# Patient Record
Sex: MALE | Race: BLACK OR AFRICAN AMERICAN | Employment: STUDENT | URBAN - METROPOLITAN AREA
[De-identification: names, ages, dates, MRNs, and addresses within clinical notes are randomized per-mention and may not be internally consistent; named-entity substitution may affect disease eponyms.]

---

## 2017-02-16 ENCOUNTER — HOSPITAL ENCOUNTER (EMERGENCY)
Facility: HOSPITAL | Age: 22
Discharge: HOME | End: 2017-02-16
Attending: PEDIATRICS

## 2017-02-16 VITALS
TEMPERATURE: 98 F | HEART RATE: 72 BPM | SYSTOLIC BLOOD PRESSURE: 128 MMHG | OXYGEN SATURATION: 100 % | RESPIRATION RATE: 18 BRPM | WEIGHT: 169.75 LBS | DIASTOLIC BLOOD PRESSURE: 78 MMHG

## 2017-02-16 DIAGNOSIS — S60.10XA SUBUNGUAL HEMATOMA OF FINGERNAIL, INITIAL ENCOUNTER: Primary | ICD-10-CM

## 2017-02-16 PROCEDURE — 11740 EVACUATION SUBUNGUAL HMTMA: CPT

## 2017-02-16 PROCEDURE — 99281 EMR DPT VST MAYX REQ PHY/QHP: CPT

## 2017-02-16 PROCEDURE — 99283 EMERGENCY DEPT VISIT LOW MDM: CPT

## 2017-02-16 NOTE — ED PROVIDER NOTES
Patient Seen in: [facility] Emergency Department    History   Patient presents with:  Nail, Ingrown (integumentary)  Cellulitis (integumentary, infectious)    Stated Complaint: toe infection    HPI    Patient is a 24-year-old male here with bilatera Supple, full range of motion. CV: Chest is clear to auscultation, no wheezes rales or rhonchi. Cardiac exam normal S1-S2, no murmurs rubs or gallops. Abdomen: Soft, nontender, nondistended. Bowel sounds present throughout.   Extremities: Warm and well p

## 2017-06-14 ENCOUNTER — HOSPITAL ENCOUNTER (EMERGENCY)
Facility: HOSPITAL | Age: 22
Discharge: HOME | End: 2017-06-14
Attending: EMERGENCY MEDICINE
Payer: MEDICAID

## 2017-06-14 VITALS
RESPIRATION RATE: 18 BRPM | OXYGEN SATURATION: 100 % | HEART RATE: 68 BPM | TEMPERATURE: 98 F | DIASTOLIC BLOOD PRESSURE: 88 MMHG | WEIGHT: 171.31 LBS | SYSTOLIC BLOOD PRESSURE: 130 MMHG

## 2017-06-14 DIAGNOSIS — B27.90 INFECTIOUS MONONUCLEOSIS WITHOUT COMPLICATION, INFECTIOUS MONONUCLEOSIS DUE TO UNSPECIFIED ORGANISM: Primary | ICD-10-CM

## 2017-06-14 PROCEDURE — 86403 PARTICLE AGGLUT ANTBDY SCRN: CPT | Performed by: EMERGENCY MEDICINE

## 2017-06-14 PROCEDURE — 80053 COMPREHEN METABOLIC PANEL: CPT | Performed by: EMERGENCY MEDICINE

## 2017-06-14 PROCEDURE — 83615 LACTATE (LD) (LDH) ENZYME: CPT | Performed by: EMERGENCY MEDICINE

## 2017-06-14 PROCEDURE — 99284 EMERGENCY DEPT VISIT MOD MDM: CPT

## 2017-06-14 PROCEDURE — 86664 EPSTEIN-BARR NUCLEAR ANTIGEN: CPT | Performed by: EMERGENCY MEDICINE

## 2017-06-14 PROCEDURE — 96374 THER/PROPH/DIAG INJ IV PUSH: CPT

## 2017-06-14 PROCEDURE — 86140 C-REACTIVE PROTEIN: CPT | Performed by: EMERGENCY MEDICINE

## 2017-06-14 PROCEDURE — 86665 EPSTEIN-BARR CAPSID VCA: CPT | Performed by: EMERGENCY MEDICINE

## 2017-06-14 PROCEDURE — 84550 ASSAY OF BLOOD/URIC ACID: CPT | Performed by: EMERGENCY MEDICINE

## 2017-06-14 PROCEDURE — 87430 STREP A AG IA: CPT | Performed by: EMERGENCY MEDICINE

## 2017-06-14 PROCEDURE — 85025 COMPLETE CBC W/AUTO DIFF WBC: CPT | Performed by: EMERGENCY MEDICINE

## 2017-06-14 PROCEDURE — 85652 RBC SED RATE AUTOMATED: CPT | Performed by: EMERGENCY MEDICINE

## 2017-06-14 PROCEDURE — 87081 CULTURE SCREEN ONLY: CPT | Performed by: EMERGENCY MEDICINE

## 2017-06-14 RX ORDER — IBUPROFEN 200 MG
200 TABLET ORAL EVERY 6 HOURS PRN
COMMUNITY

## 2017-06-14 RX ORDER — METHYLPREDNISOLONE SODIUM SUCCINATE 125 MG/2ML
125 INJECTION, POWDER, LYOPHILIZED, FOR SOLUTION INTRAMUSCULAR; INTRAVENOUS ONCE
Status: COMPLETED | OUTPATIENT
Start: 2017-06-14 | End: 2017-06-14

## 2017-06-14 RX ORDER — PREDNISONE 20 MG/1
60 TABLET ORAL DAILY
Qty: 9 TABLET | Refills: 0 | Status: SHIPPED | OUTPATIENT
Start: 2017-06-14 | End: 2017-06-17

## 2017-06-14 NOTE — ED PROVIDER NOTES
Patient Seen in: [facility] Emergency Department    History   Patient presents with:  Neck Pain (musculoskeletal, neurologic)  Swelling Edema (cardiovascular, metabolic)    Stated Complaint: neck pain    HPI    Henna Frank is a 65-year-old who presents (36.7 °C)   Temp src 06/14/17 1412 Temporal   SpO2 06/14/17 1411 100 %   O2 Device 06/14/17 1411 None (Room air)       Current:/88 mmHg  Pulse 68  Temp(Src) 98 °F (36.7 °C) (Temporal)  Resp 18  Wt 77.7 kg  SpO2 100%        Physical Exam  General: Evangelina Sing RAPID STREP A SCREEN (LC) - Normal   CBC WITH DIFFERENTIAL WITH PLATELET    Narrative: The following orders were created for panel order CBC WITH DIFFERENTIAL WITH PLATELET.   Procedure                               Abnormality         Status

## 2017-08-11 ENCOUNTER — APPOINTMENT (OUTPATIENT)
Dept: CT IMAGING | Facility: HOSPITAL | Age: 22
DRG: 153 | End: 2017-08-11
Attending: EMERGENCY MEDICINE
Payer: MEDICAID

## 2017-08-11 ENCOUNTER — HOSPITAL ENCOUNTER (INPATIENT)
Facility: HOSPITAL | Age: 22
LOS: 3 days | Discharge: HOME | DRG: 153 | End: 2017-08-14
Attending: EMERGENCY MEDICINE | Admitting: HOSPITALIST
Payer: MEDICAID

## 2017-08-11 DIAGNOSIS — J36 PERITONSILLAR CELLULITIS: Primary | ICD-10-CM

## 2017-08-11 DIAGNOSIS — R55 SYNCOPE AND COLLAPSE: ICD-10-CM

## 2017-08-11 PROBLEM — D72.829 LEUKOCYTOSIS: Status: ACTIVE | Noted: 2017-08-11

## 2017-08-11 PROBLEM — E87.6 HYPOKALEMIA: Status: ACTIVE | Noted: 2017-08-11

## 2017-08-11 LAB
ALBUMIN SERPL-MCNC: 3.7 G/DL (ref 3.5–4.8)
ALP LIVER SERPL-CCNC: 93 U/L (ref 45–117)
ALT SERPL-CCNC: 17 U/L (ref 17–63)
AST SERPL-CCNC: 18 U/L (ref 15–41)
BASOPHILS # BLD AUTO: 0.03 X10(3) UL (ref 0–0.1)
BASOPHILS NFR BLD AUTO: 0.2 %
BILIRUB SERPL-MCNC: 2.1 MG/DL (ref 0.1–2)
BUN BLD-MCNC: 6 MG/DL (ref 8–20)
CALCIUM BLD-MCNC: 9.8 MG/DL (ref 8.3–10.3)
CHLORIDE: 102 MMOL/L (ref 101–111)
CO2: 26 MMOL/L (ref 22–32)
CREAT BLD-MCNC: 0.95 MG/DL (ref 0.7–1.3)
EOSINOPHIL # BLD AUTO: 0.15 X10(3) UL (ref 0–0.3)
EOSINOPHIL NFR BLD AUTO: 1 %
ERYTHROCYTE [DISTWIDTH] IN BLOOD BY AUTOMATED COUNT: 11.9 % (ref 11.5–16)
FREE T4: 1.1 NG/DL (ref 0.9–1.8)
GLUCOSE BLD-MCNC: 106 MG/DL (ref 70–99)
HCT VFR BLD AUTO: 47 % (ref 37–53)
HGB BLD-MCNC: 15.1 G/DL (ref 13–17)
IMMATURE GRANULOCYTE COUNT: 0.04 X10(3) UL (ref 0–1)
IMMATURE GRANULOCYTE RATIO %: 0.3 %
LYMPHOCYTES # BLD AUTO: 1.36 X10(3) UL (ref 0.9–4)
LYMPHOCYTES NFR BLD AUTO: 9.2 %
M PROTEIN MFR SERPL ELPH: 8.2 G/DL (ref 6.1–8.3)
MCH RBC QN AUTO: 28 PG (ref 27–33.2)
MCHC RBC AUTO-ENTMCNC: 32.1 G/DL (ref 31–37)
MCV RBC AUTO: 87.2 FL (ref 80–99)
MONOCYTES # BLD AUTO: 1.14 X10(3) UL (ref 0.1–0.6)
MONOCYTES NFR BLD AUTO: 7.7 %
MONOSCREEN: POSITIVE
NEUTROPHIL ABS PRELIM: 12.03 X10 (3) UL (ref 1.3–6.7)
NEUTROPHILS # BLD AUTO: 12.03 X10(3) UL (ref 1.3–6.7)
NEUTROPHILS NFR BLD AUTO: 81.6 %
PLATELET # BLD AUTO: 325 10(3)UL (ref 150–450)
POTASSIUM SERPL-SCNC: 3.2 MMOL/L (ref 3.6–5.1)
RBC # BLD AUTO: 5.39 X10(6)UL (ref 4.3–5.7)
RED CELL DISTRIBUTION WIDTH-SD: 38 FL (ref 35.1–46.3)
SODIUM SERPL-SCNC: 137 MMOL/L (ref 136–144)
TROPONIN: <0.046 NG/ML (ref ?–0.05)
TSI SER-ACNC: 0.82 MIU/ML (ref 0.35–5.5)
WBC # BLD AUTO: 14.8 X10(3) UL (ref 4–13)

## 2017-08-11 PROCEDURE — 99223 1ST HOSP IP/OBS HIGH 75: CPT | Performed by: HOSPITALIST

## 2017-08-11 PROCEDURE — 70491 CT SOFT TISSUE NECK W/DYE: CPT | Performed by: EMERGENCY MEDICINE

## 2017-08-11 RX ORDER — SODIUM CHLORIDE 9 MG/ML
INJECTION, SOLUTION INTRAVENOUS CONTINUOUS
Status: CANCELLED | OUTPATIENT
Start: 2017-08-11 | End: 2017-08-12

## 2017-08-11 RX ORDER — HYDROMORPHONE HYDROCHLORIDE 1 MG/ML
0.5 INJECTION, SOLUTION INTRAMUSCULAR; INTRAVENOUS; SUBCUTANEOUS ONCE
Status: COMPLETED | OUTPATIENT
Start: 2017-08-11 | End: 2017-08-11

## 2017-08-11 RX ORDER — CLINDAMYCIN PHOSPHATE 600 MG/50ML
600 INJECTION INTRAVENOUS ONCE
Status: COMPLETED | OUTPATIENT
Start: 2017-08-11 | End: 2017-08-11

## 2017-08-11 RX ORDER — KETOROLAC TROMETHAMINE 30 MG/ML
30 INJECTION, SOLUTION INTRAMUSCULAR; INTRAVENOUS ONCE
Status: COMPLETED | OUTPATIENT
Start: 2017-08-11 | End: 2017-08-11

## 2017-08-11 NOTE — ED INITIAL ASSESSMENT (HPI)
Complaining of Throat swelling, unable to swallow, spitting, right ear pain, difficulty breathing. Hard time talking. Passed out twice today.

## 2017-08-12 ENCOUNTER — APPOINTMENT (OUTPATIENT)
Dept: CV DIAGNOSTICS | Facility: HOSPITAL | Age: 22
DRG: 153 | End: 2017-08-12
Attending: HOSPITALIST
Payer: MEDICAID

## 2017-08-12 LAB
AMPHETAMINE URINE: NEGATIVE
BARBITURATES URINE: NEGATIVE
BILIRUB UR QL STRIP.AUTO: NEGATIVE
CLARITY UR REFRACT.AUTO: CLEAR
COCAINE URINE: NEGATIVE
GLUCOSE UR STRIP.AUTO-MCNC: NEGATIVE MG/DL
KETONES UR STRIP.AUTO-MCNC: 80 MG/DL
LEUKOCYTE ESTERASE UR QL STRIP.AUTO: NEGATIVE
NITRITE UR QL STRIP.AUTO: NEGATIVE
PCP URINE: NEGATIVE
PH UR STRIP.AUTO: 6 [PH] (ref 4.5–8)
POTASSIUM SERPL-SCNC: 3.7 MMOL/L (ref 3.6–5.1)
PROT UR STRIP.AUTO-MCNC: 100 MG/DL
RBC UR QL AUTO: NEGATIVE
SP GR UR STRIP.AUTO: >1.06 (ref 1–1.03)
TROPONIN: <0.046 NG/ML (ref ?–0.05)
UROBILINOGEN UR STRIP.AUTO-MCNC: 4 MG/DL

## 2017-08-12 PROCEDURE — 93306 TTE W/DOPPLER COMPLETE: CPT | Performed by: HOSPITALIST

## 2017-08-12 PROCEDURE — 99233 SBSQ HOSP IP/OBS HIGH 50: CPT | Performed by: HOSPITALIST

## 2017-08-12 RX ORDER — KETOROLAC TROMETHAMINE 30 MG/ML
30 INJECTION, SOLUTION INTRAMUSCULAR; INTRAVENOUS EVERY 6 HOURS PRN
Status: DISPENSED | OUTPATIENT
Start: 2017-08-12 | End: 2017-08-14

## 2017-08-12 RX ORDER — IBUPROFEN 200 MG
200 TABLET ORAL EVERY 4 HOURS PRN
Status: DISCONTINUED | OUTPATIENT
Start: 2017-08-12 | End: 2017-08-14

## 2017-08-12 RX ORDER — DEXAMETHASONE SODIUM PHOSPHATE 4 MG/ML
4 VIAL (ML) INJECTION EVERY 6 HOURS
Status: DISCONTINUED | OUTPATIENT
Start: 2017-08-12 | End: 2017-08-14

## 2017-08-12 RX ORDER — ACETAMINOPHEN 325 MG/1
650 TABLET ORAL EVERY 6 HOURS PRN
Status: DISCONTINUED | OUTPATIENT
Start: 2017-08-12 | End: 2017-08-14

## 2017-08-12 RX ORDER — PREDNISONE 20 MG/1
40 TABLET ORAL
Status: DISCONTINUED | OUTPATIENT
Start: 2017-08-12 | End: 2017-08-12

## 2017-08-12 RX ORDER — NICOTINE 21 MG/24HR
1 PATCH, TRANSDERMAL 24 HOURS TRANSDERMAL DAILY
Status: DISCONTINUED | OUTPATIENT
Start: 2017-08-12 | End: 2017-08-14

## 2017-08-12 RX ORDER — IBUPROFEN 400 MG/1
400 TABLET ORAL EVERY 4 HOURS PRN
Status: DISCONTINUED | OUTPATIENT
Start: 2017-08-12 | End: 2017-08-14

## 2017-08-12 RX ORDER — ALBUTEROL SULFATE 90 UG/1
2 AEROSOL, METERED RESPIRATORY (INHALATION) EVERY 6 HOURS PRN
Status: DISCONTINUED | OUTPATIENT
Start: 2017-08-12 | End: 2017-08-14

## 2017-08-12 RX ORDER — SODIUM CHLORIDE 9 MG/ML
INJECTION, SOLUTION INTRAVENOUS CONTINUOUS
Status: DISCONTINUED | OUTPATIENT
Start: 2017-08-12 | End: 2017-08-14

## 2017-08-12 RX ORDER — POTASSIUM CHLORIDE 14.9 MG/ML
20 INJECTION INTRAVENOUS ONCE
Status: COMPLETED | OUTPATIENT
Start: 2017-08-12 | End: 2017-08-12

## 2017-08-12 RX ORDER — IBUPROFEN 600 MG/1
600 TABLET ORAL EVERY 4 HOURS PRN
Status: DISCONTINUED | OUTPATIENT
Start: 2017-08-12 | End: 2017-08-14

## 2017-08-12 RX ORDER — ONDANSETRON 2 MG/ML
4 INJECTION INTRAMUSCULAR; INTRAVENOUS EVERY 4 HOURS PRN
Status: DISCONTINUED | OUTPATIENT
Start: 2017-08-12 | End: 2017-08-14

## 2017-08-12 RX ORDER — CLINDAMYCIN PHOSPHATE 600 MG/50ML
600 INJECTION INTRAVENOUS EVERY 8 HOURS
Status: DISCONTINUED | OUTPATIENT
Start: 2017-08-12 | End: 2017-08-12

## 2017-08-12 NOTE — PROGRESS NOTES
[facility]acy Note:  Renal Adjustment for Unasyn (ampicillin/sulbactam)    Ashley Vigil. is a 25year old male who has been prescribed Unasyn (ampicillin/sulbactam) 1.5 g every 6 hrs.   CrCl is estimated creatinine clearance is 140.5 mL/min

## 2017-08-12 NOTE — ED PROVIDER NOTES
Patient Seen in: [facility] 4nw-a    History   Patient presents with:  Syncope (cardiovascular, neurologic)  Swelling Edema (cardiovascular, metabolic)    Stated Complaint: facial swelling syncope    HPI    This is a 14-year-old male complaining of 0.00      Smokeless tobacco: Never Used                      Alcohol use: Yes              Comment: last drink- last night      Review of Systems    Positive for stated complaint: facial swelling syncope  Other systems are as noted in HPI.   Constitutional normally. No focal deficits visualized.     ED Course     Labs Reviewed   COMP METABOLIC PANEL (14) - Abnormal; Notable for the following:        Result Value    Glucose 106 (*)     BUN 6 (*)     Bilirubin, Total 2.1 (*)     Potassium 3.2 (*)     All other ([facility]) NRDR ([address]) which includes the Dose Index Registry. PATIENT STATED HISTORY:(As transcribed by Technologist)  Patient has swelling in his throat, spitting, unable to swallow and right ear pain.  Pa Course  ------------------------------------------------------------   Initially patient's orthostatic vital signs were abnormal as his pulse jumped up with standing and he felt dizzy.   He had an IV placed and was given 2 L of normal saline with some impro

## 2017-08-12 NOTE — PROGRESS NOTES
NURSING ADMISSION NOTE      Patient admitted via Cart from ER accompanied by his mother. Oriented to room. Safety precautions initiated, bed in low position  Call lights within reach.   Afebrile on admission,c/o  severe right sided  Throat pain, no s/s

## 2017-08-12 NOTE — PROGRESS NOTES
[facility]  Progress Note     Jose Gabriel [facility] Gregory Good.  Patient Status:  Observation    10/29/1995 MRN [id number]   [facility] 4NW-A Attending Bony Huston MD   Hosp Day # 0 PCP CEDRIC KUMARI, Aisha Cox MD     Chief Complaint: Moises Aguirre to IV Unasyn  3. Start IV steroids  4. Monitor airway, sats  2. Abnormal EKG, 2D echo pending  3. Hypokalemia, replace  4. H/o Kawasaki    Plan of care: switch IV abx, start IV steroids, await 2D echo    Quality:  · DVT Prophylaxis: SCDs  · CODE status:  Fu

## 2017-08-12 NOTE — H&P
[place] HOSPITALIST  History and Physical     [address] [place] [address].  Patient Status:  Emergency    10/29/1995 MRN [id number]   Location [address] [facility] [address] [facility] Attending Sandro Lerner MD   Hosp Day # 0 PCP [facility] MD, Soheila Ramos MD Wheezing. Disp:  Rfl:    fluticasone (FLOVENT HFA) 110 MCG/ACT Inhalation Aerosol Inhale 1 puff into the lungs 2 (two) times daily.  Disp:  Rfl:    ipratropium-albuterol (COMBIVENT)  MCG/ACT Inhalation Aerosol Inhale 2 puffs into the lungs every 6 (si the age of 5    Quality:  · DVT Prophylaxis: low risk  · CODE status: full  · Rodriguez: no  Plan of care discussed with patient     Eladio Mattson MD  8/11/2017

## 2017-08-12 NOTE — PROGRESS NOTES
Throat/ neck pain improved since this am. Able to tolerate solids. Temp max 99.9  This shift in am. Profuse sweating on and off. Orthostatics checked x1. On Telemetry- SR-ST. On RA/CPOX sats 100%.

## 2017-08-12 NOTE — CONSULTS
[facility]  Report of Consultation    Zoey Clemons.  Patient Status:  Observation    10/29/1995 MRN [id number]   [facility] 4NW-A Attending Mesha Campbell MD   Hosp Day # 0 PCP CEDRIC KUMARI, Kimberly Szymanski MD     Reason for Consult Oral Q4H PRN   Or      ibuprofen (MOTRIN) tab 600 mg 600 mg Oral Q4H PRN   0.9%  NaCl infusion  Intravenous Continuous   clindamycin in D5W (CLEOCIN) premix 600mg/50ml 600 mg Intravenous Q8H   Umeclidinium Bromide (INCRUSE ELLIPTA) 62.5 MCG/INH inhaler 1 p fracture of metatarsal bone(s)     Hypokalemia     Leukocytosis     Peritonsillar cellulitis     Abnormal EKG     Kawasaki disease ([facility] [address]      1. Infectious mononucleosis  2.  Right parapharyngeal cellulitis with enlargement of the right lingual tonsil and r

## 2017-08-13 ENCOUNTER — APPOINTMENT (OUTPATIENT)
Dept: GENERAL RADIOLOGY | Facility: HOSPITAL | Age: 22
DRG: 153 | End: 2017-08-13
Attending: INTERNAL MEDICINE
Payer: MEDICAID

## 2017-08-13 LAB
ATRIAL RATE: 86 BPM
ATRIAL RATE: 95 BPM
BASOPHILS # BLD AUTO: 0.03 X10(3) UL (ref 0–0.1)
BASOPHILS NFR BLD AUTO: 0.1 %
BETA NATRIURETIC PEPTIDE: 23 PG/ML (ref 2–99)
BUN BLD-MCNC: 9 MG/DL (ref 8–20)
CALCIUM BLD-MCNC: 9.3 MG/DL (ref 8.3–10.3)
CHLORIDE: 107 MMOL/L (ref 101–111)
CHOLEST SMN-MCNC: 161 MG/DL (ref ?–170)
CK: 81 IU/L (ref 39–308)
CO2: 26 MMOL/L (ref 22–32)
CREAT BLD-MCNC: 0.78 MG/DL (ref 0.7–1.3)
EOSINOPHIL # BLD AUTO: 0 X10(3) UL (ref 0–0.3)
EOSINOPHIL NFR BLD AUTO: 0 %
ERYTHROCYTE [DISTWIDTH] IN BLOOD BY AUTOMATED COUNT: 11.7 % (ref 11.5–16)
GLUCOSE BLD-MCNC: 140 MG/DL (ref 70–99)
HAV IGM SER QL: 2.2 MG/DL (ref 1.7–3)
HCT VFR BLD AUTO: 43.1 % (ref 37–53)
HDLC SERPL-MCNC: 77 MG/DL (ref 45–?)
HDLC SERPL: 2.09 {RATIO} (ref ?–4.97)
HGB BLD-MCNC: 13.8 G/DL (ref 13–17)
IMMATURE GRANULOCYTE COUNT: 0.47 X10(3) UL (ref 0–1)
IMMATURE GRANULOCYTE RATIO %: 1.8 %
LDLC SERPL CALC-MCNC: 71 MG/DL (ref ?–100)
LDLC SERPL-MCNC: 13 MG/DL (ref 5–40)
LYMPHOCYTES # BLD AUTO: 0.6 X10(3) UL (ref 0.9–4)
LYMPHOCYTES NFR BLD AUTO: 2.3 %
MCH RBC QN AUTO: 28.2 PG (ref 27–33.2)
MCHC RBC AUTO-ENTMCNC: 32 G/DL (ref 31–37)
MCV RBC AUTO: 88 FL (ref 80–99)
MONOCYTES # BLD AUTO: 1.46 X10(3) UL (ref 0.1–0.6)
MONOCYTES NFR BLD AUTO: 5.6 %
NEUTROPHIL ABS PRELIM: 23.43 X10 (3) UL (ref 1.3–6.7)
NEUTROPHILS # BLD AUTO: 23.43 X10(3) UL (ref 1.3–6.7)
NEUTROPHILS NFR BLD AUTO: 90.2 %
NONHDLC SERPL-MCNC: 84 MG/DL (ref ?–120)
P AXIS: 106 DEGREES
P AXIS: 37 DEGREES
P-R INTERVAL: 144 MS
P-R INTERVAL: 150 MS
PLATELET # BLD AUTO: 334 10(3)UL (ref 150–450)
POTASSIUM SERPL-SCNC: 4.3 MMOL/L (ref 3.6–5.1)
POTASSIUM SERPL-SCNC: 4.3 MMOL/L (ref 3.6–5.1)
Q-T INTERVAL: 324 MS
Q-T INTERVAL: 336 MS
QRS DURATION: 94 MS
QRS DURATION: 98 MS
QTC CALCULATION (BEZET): 402 MS
QTC CALCULATION (BEZET): 407 MS
R AXIS: 87 DEGREES
R AXIS: 98 DEGREES
RBC # BLD AUTO: 4.9 X10(6)UL (ref 4.3–5.7)
RED CELL DISTRIBUTION WIDTH-SD: 37.8 FL (ref 35.1–46.3)
SED RATE-ML: 31 MM/HR (ref 0–12)
SODIUM SERPL-SCNC: 141 MMOL/L (ref 136–144)
T AXIS: 133 DEGREES
T AXIS: 33 DEGREES
TRIGLYCERIDES: 63 MG/DL (ref ?–90)
TROPONIN: <0.046 NG/ML (ref ?–0.05)
VENTRICULAR RATE: 86 BPM
VENTRICULAR RATE: 95 BPM
WBC # BLD AUTO: 26 X10(3) UL (ref 4–13)

## 2017-08-13 PROCEDURE — 99232 SBSQ HOSP IP/OBS MODERATE 35: CPT | Performed by: HOSPITALIST

## 2017-08-13 PROCEDURE — 71010 XR CHEST AP PORTABLE  (CPT=71010): CPT | Performed by: INTERNAL MEDICINE

## 2017-08-13 RX ORDER — MORPHINE SULFATE 4 MG/ML
2 INJECTION, SOLUTION INTRAMUSCULAR; INTRAVENOUS EVERY 4 HOURS PRN
Status: DISCONTINUED | OUTPATIENT
Start: 2017-08-13 | End: 2017-08-14

## 2017-08-13 NOTE — PROGRESS NOTES
Afebrile this shift. Sweats profusely at times. Throat discomfort improved. Medicated w/motrin prn. Tolerating regular diet. No c/o chest pain. SR on monitor.

## 2017-08-13 NOTE — PLAN OF CARE
CARDIOVASCULAR - ADULT    • Maintains optimal cardiac output and hemodynamic stability Progressing    • Absence of cardiac arrhythmias or at baseline Progressing        Impaired Swallowing    • Minimize aspiration risk Progressing        Patient/Family [facility]

## 2017-08-13 NOTE — PROGRESS NOTES
[facility]  Progress Note     Amadou So [facility] Rosa Ashley.  Patient Status:  Observation    10/29/1995 MRN [id number]   [facility] 4NW-A Attending Bo Lan MD   Hosp Day # 0 PCP CEDRIC KUMARI, Arden Marquez MD     Chief Complaint: Corby [place]hatan <0.046  <0.046   CK   --    --   81            Imaging: Imaging data reviewed in Epic.     Medications:   • Umeclidinium Bromide  1 puff Inhalation Daily   • dexamethasone Sodium Phosphate  4 mg Intravenous Q6H   • ampicillin-sulbactam  3 g Intravenous Q8H

## 2017-08-13 NOTE — PROGRESS NOTES
Results of this am EKG called to Dr Eros Cordero. Troponin negative. CP 2 on pain scale with deep breath.  Medicated w/motrin

## 2017-08-13 NOTE — CONSULTS
INFECTIOUS DISEASE CONSULT NOTE    Shiva Davis.  Patient Status:  Observation    10/29/1995 MRN [id number]   [facility] 4NW-A Attending Last Ron MD   [facility] Day # •  morphINE sulfate (PF) 4 MG/ML injection 2 mg, 2 mg, Intravenous, Q4H PRN **OR** morphINE sulfate (PF) 4 MG/ML injection 2 mg, 2 mg, Intravenous, Q4H PRN  •  ondansetron HCl (ZOFRAN) injection 4 mg, 4 mg, Intravenous, Q4H PRN  •  acetaminophen (TYLENOL lesions. No erythema. No open wounds. Laboratory Data:    Recent Labs   Lab  08/11/17   1855  08/13/17   0614   RBC  5.39  4.90   HGB  15.1  13.8   HCT  47.0  43.1   MCV  87.2  88.0   MCH  28.0  28.2   MCHC  32.1  32.0   RDW  11.9  11.7   NEPRELIM  12. 0 NASOPHARYNX:  Negative. Fossae of Rosenmuller and torus tubarius are symmetric. ORAL CAVITY:  Negative. No visible mass.   OROPHARYNX:  There is enlargement/swelling of the right lingual tonsil with associated diffuse right-sided parapharyngeal low densi 8/13/2017 at 7:15     Approved by: Franny Juan DO               ASSESSMENT:    1. R parapharyngeal cellulitis, no abscess noted per CT  -clinically better, less swelling    2.  Reactive cervical LAD- due to above  -mom tells me that he has chronic R neck

## 2017-08-13 NOTE — PROGRESS NOTES
[facility] Card[facility] Progress Note    Subjective:  Feeling better.      Objective:  /87 (BP Location: Left arm)   Pulse 75   Temp (!) 97.4 °F (36.3 °C) (Oral)   Resp 18   Ht 198.1 cm (6' 6\")   Wt 178 lb 3.2 oz (80.8 kg)   SpO2 97%   BMI 20.59 kg/m²

## 2017-08-14 VITALS
WEIGHT: 178.19 LBS | HEIGHT: 78 IN | DIASTOLIC BLOOD PRESSURE: 71 MMHG | HEART RATE: 77 BPM | RESPIRATION RATE: 18 BRPM | OXYGEN SATURATION: 98 % | TEMPERATURE: 98 F | BODY MASS INDEX: 20.62 KG/M2 | SYSTOLIC BLOOD PRESSURE: 140 MMHG

## 2017-08-14 LAB
ALBUMIN SERPL-MCNC: 2.6 G/DL (ref 3.5–4.8)
ALP LIVER SERPL-CCNC: 74 U/L (ref 45–117)
ALT SERPL-CCNC: 32 U/L (ref 17–63)
AST SERPL-CCNC: 26 U/L (ref 15–41)
ATRIAL RATE: 113 BPM
ATRIAL RATE: 72 BPM
BASOPHILS # BLD AUTO: 0.02 X10(3) UL (ref 0–0.1)
BASOPHILS NFR BLD AUTO: 0.1 %
BILIRUB SERPL-MCNC: 0.7 MG/DL (ref 0.1–2)
BUN BLD-MCNC: 10 MG/DL (ref 8–20)
CALCIUM BLD-MCNC: 9.3 MG/DL (ref 8.3–10.3)
CHLORIDE: 106 MMOL/L (ref 101–111)
CO2: 27 MMOL/L (ref 22–32)
CREAT BLD-MCNC: 0.71 MG/DL (ref 0.7–1.3)
EOSINOPHIL # BLD AUTO: 0 X10(3) UL (ref 0–0.3)
EOSINOPHIL NFR BLD AUTO: 0 %
ERYTHROCYTE [DISTWIDTH] IN BLOOD BY AUTOMATED COUNT: 11.9 % (ref 11.5–16)
GLUCOSE BLD-MCNC: 161 MG/DL (ref 70–99)
HCT VFR BLD AUTO: 40.1 % (ref 37–53)
HGB BLD-MCNC: 12.9 G/DL (ref 13–17)
IMMATURE GRANULOCYTE COUNT: 0.13 X10(3) UL (ref 0–1)
IMMATURE GRANULOCYTE RATIO %: 0.8 %
LYMPHOCYTES # BLD AUTO: 0.98 X10(3) UL (ref 0.9–4)
LYMPHOCYTES NFR BLD AUTO: 6.2 %
M PROTEIN MFR SERPL ELPH: 6.9 G/DL (ref 6.1–8.3)
MCH RBC QN AUTO: 28.5 PG (ref 27–33.2)
MCHC RBC AUTO-ENTMCNC: 32.2 G/DL (ref 31–37)
MCV RBC AUTO: 88.7 FL (ref 80–99)
MONOCYTES # BLD AUTO: 0.78 X10(3) UL (ref 0.1–0.6)
MONOCYTES NFR BLD AUTO: 4.9 %
NEUTROPHIL ABS PRELIM: 13.86 X10 (3) UL (ref 1.3–6.7)
NEUTROPHILS # BLD AUTO: 13.86 X10(3) UL (ref 1.3–6.7)
NEUTROPHILS NFR BLD AUTO: 88 %
P AXIS: 52 DEGREES
P AXIS: 52 DEGREES
P-R INTERVAL: 134 MS
P-R INTERVAL: 152 MS
PLATELET # BLD AUTO: 340 10(3)UL (ref 150–450)
POTASSIUM SERPL-SCNC: 4.2 MMOL/L (ref 3.6–5.1)
Q-T INTERVAL: 302 MS
Q-T INTERVAL: 354 MS
QRS DURATION: 76 MS
QRS DURATION: 92 MS
QTC CALCULATION (BEZET): 387 MS
QTC CALCULATION (BEZET): 414 MS
R AXIS: 103 DEGREES
R AXIS: 99 DEGREES
RBC # BLD AUTO: 4.52 X10(6)UL (ref 4.3–5.7)
RED CELL DISTRIBUTION WIDTH-SD: 38.4 FL (ref 35.1–46.3)
SODIUM SERPL-SCNC: 140 MMOL/L (ref 136–144)
T AXIS: 45 DEGREES
T AXIS: 57 DEGREES
VENTRICULAR RATE: 113 BPM
VENTRICULAR RATE: 72 BPM
WBC # BLD AUTO: 15.8 X10(3) UL (ref 4–13)

## 2017-08-14 PROCEDURE — 99239 HOSP IP/OBS DSCHRG MGMT >30: CPT | Performed by: HOSPITALIST

## 2017-08-14 RX ORDER — PREDNISONE 20 MG/1
40 TABLET ORAL DAILY
Qty: 10 TABLET | Refills: 0 | Status: SHIPPED | OUTPATIENT
Start: 2017-08-15 | End: 2017-08-20

## 2017-08-14 RX ORDER — AMOXICILLIN AND CLAVULANATE POTASSIUM 875; 125 MG/1; MG/1
1 TABLET, FILM COATED ORAL 2 TIMES DAILY
Qty: 14 TABLET | Refills: 0 | Status: SHIPPED | OUTPATIENT
Start: 2017-08-14 | End: 2017-08-21

## 2017-08-14 NOTE — PLAN OF CARE
Absence of cardiac arrhythmias or at baseline Progressing    Has been NSR on tele. Remains afebrile. States throat pain is 3/10. Jaja Garsia given at [facility]. Many friends in [place] this david. Girlfriend at [facility] throughout the night. IV antibiotics as ordered and IV fluids.

## 2017-08-14 NOTE — PROGRESS NOTES
[address] [place] [address]  TEL: [phone]  FAX: [phone]    Machelle Cortés.  Patient Status:  Observation    10/29/1995 MRN [id number]   [facility] 4NW-A Attending Edilia Holley MD   [address] [facility] Day # 0 PC in EMR,     Radiology: Ct Soft Tissue Of Neck(contrast Only) (cpt=70491)    Result Date: 8/11/2017  PROCEDURE:  CT OF THE SOFT TISSUE NECK WITH CONTRAST  COMPARISON:  None.   INDICATIONS:  25year-old male presents with swelling in the region of the throat, lesions. OTHER:  No additional findings. CONCLUSION:  There is evidence of a right-sided parapharyngeal cellulitis with enlargement of the right lingual tonsil and right epiglottis. Right-sided cervical chain adenopathy also noted.  No discrete evide d/w pt and mom    [place] [address]

## 2017-08-14 NOTE — PROGRESS NOTES
Reviewed all discharge instructions with patient and his mother explained all medications and to follow up with his pcp. Patient verbalized his understanding of teaching. all belonging taken with patient.

## 2017-08-14 NOTE — PAYOR COMM NOTE
--------------  ADMISSION REVIEW     Payor: N/A  Subscriber #:  [id number]  Authorization Number: N/A    Admit date: 8/11/17  Admit time: 2359       Admitting Physician: Diana Peoples MD  Attending Physician:  Sabrina Gerard MD  Primary Care Physician: Ca needed.     Family History   Problem Relation Age of Onset   • Diabetes Mother    • Hypertension Mother    • Diabetes Maternal Grandmother    • Hypertension Maternal Grandmother    • Lipids Maternal Grandmother    • Cancer Paternal Grandmother    • Heart Agustina Weiner Normal capillary refill. SKIN: Well perfused, without cyanosis. No rashes. NEUROLOGIC: Cranial nerves II through XII are intact moving all extremities normally. No focal deficits visualized.     ED Course[JM.1]     Labs Reviewed   COMP METABOLIC PANEL ( anything orally. He has evidence of sore throat and lymphadenitis without abscess. I recommended admission for continued hydration and pain control.   EKG does show evidence of left ventricular hypertrophy which does not necessarily have anything to do wi point review of systems was completed. Pertinent positives and negatives noted in the HPI.   Past Medical History:  As noted above in ED MD Assessment    Allergies: No Known Allergies    Physical Exam:    /90   Pulse 90   Temp 98.4 °F (36.9 °C) (Te status: full[MD.2]  · Rodriguez: no[MD.2]  Plan of care discussed with patient[MD.2]     Violeta Wilson MD  8/11/2017    MEDICATIONS ADMINISTERED IN LAST 1 DAY:  Ampicillin-Sulbactam Sodium (UNASYN) 3 g in sodium chloride 0.9 % 100 mL IVPB-minibag     Date Act

## 2017-08-14 NOTE — PROGRESS NOTES
[facility]  Progress Note     Marcelino Art [facility] Jere Wiley.  Patient Status:  Observation    10/29/1995 MRN [id number]   [facility] 4NW-A Attending Hailee Nina MD   Hosp Day # 0 PCP CEDRIC KUMARI, Fili Ray MD     Chief Complaint: Quinten Timlesia <0.046   CK   --    --   81            Imaging: Imaging data reviewed in Epic.     Medications:   • Umeclidinium Bromide  1 puff Inhalation Daily   • dexamethasone Sodium Phosphate  4 mg Intravenous Q6H   • ampicillin-sulbactam  3 g Intravenous Q8H   • blanco

## 2017-08-15 NOTE — DISCHARGE SUMMARY
[facility]  DISCHARGE SUMMARY     Lilian Poe.  Patient Status:  Inpatient    10/29/1995 MRN [id number]   [facility] 4NW-A Attending No att. providers found   Hosp Day # 3 PCP CEDRIC KUMARI, Lindsey Louise MD     Date of  parapharyngeal cellulitis. He was recently diagnosed with infectious mononucleosis. He was started on IV antibiotics as well as IV steroids. His symptoms significantly improved.   He did have an abnormal EKG and has a history of Kawasaki and therefore wa Wheezing.    Refills:  0           Where to Get Your Medications      Please  your prescriptions at the location directed by your doctor or nurse    Bring a paper prescription for each of these medications  · Amoxicillin-Pot Clavulanate 875-125 MG Ta

## (undated) NOTE — ED AVS SNAPSHOT
[facility] Emergency Department    [place] [address] [place] [address]    Phone:  [phone]    Fax:  [address].    MRN: [id number]    Department:  [facility] Emergency Department   Date of that Physician. IF THERE IS ANY CHANGE OR WORSENING OF YOUR CONDITION, CALL YOUR PRIMARY CARE PHYSICIAN AT ONCE OR RETURN IMMEDIATELY TO THE EMERGENCY DEPARTMENT.     If you have been prescribed any medication(s), please fill your prescription right a

## (undated) NOTE — ED AVS SNAPSHOT
[facility] Emergency Department    [place] [address] [place] [address]    Phone:  [phone]    Fax:  [email or web address]] Rosa Ashley.    MRN: [id number]    Department:  [facility] Emergency Department   Date You were examined and treated today on an urgent basis only. This was not a substitute for ongoing medical care. Often, one Emergency Department visit does not uncover every injury or illness.  If you have been referred to a primary care or a specialist ph [place] [address] ([facility] [address]) [address] [facility] [address] [place] [phone] [address]) [phone]                Additional Information       We are concerned

## (undated) NOTE — ED AVS SNAPSHOT
[facility] Emergency Department    [place] [address] [address]    Phone:  [phone]    Fax:  [address] [place] [address] Fidelina Arias.    MRN: [id number]    Department:  [facility] Emergency Department   Date of that Physician. IF THERE IS ANY CHANGE OR WORSENING OF YOUR CONDITION, CALL YOUR PRIMARY CARE PHYSICIAN AT ONCE OR RETURN IMMEDIATELY TO THE EMERGENCY DEPARTMENT.     If you have been prescribed any medication(s), please fill your prescription right a

## (undated) NOTE — ED AVS SNAPSHOT
[facility] Emergency Department    [place] [address] [place] [address]    Phone:  [phone]    Fax:  [address] [facility] Fannie Betancourt.    MRN: [id number]    Department:  [facility] Emergency Department   Date Prednisone 60 mg once per day for the next 3 days. Ibuprofen 800 mg every 6 hours as needed for pain. Encourage frequent fluids. Return for worsening symptoms, fever or any concerns.     Discharge References/Attachments     MONONUCLEOSIS (ENGLISH) IF THERE IS ANY CHANGE OR WORSENING OF YOUR CONDITION, CALL YOUR PRIMARY CARE PHYSICIAN AT ONCE OR RETURN IMMEDIATELY TO THE EMERGENCY DEPARTMENT.     If you have been prescribed any medication(s), please fill your prescription right away and begin taking t Patient [address] to help you get signed up for insurance coverage. Patient [address] is a Federal Navigator program that can help with your Affordable Care Act coverage, as well as all types of Medicaid plans.   To get signed up and covere